# Patient Record
Sex: FEMALE | Race: ASIAN | Employment: UNEMPLOYED | URBAN - METROPOLITAN AREA
[De-identification: names, ages, dates, MRNs, and addresses within clinical notes are randomized per-mention and may not be internally consistent; named-entity substitution may affect disease eponyms.]

---

## 2018-08-14 ENCOUNTER — APPOINTMENT (EMERGENCY)
Dept: RADIOLOGY | Facility: HOSPITAL | Age: 58
End: 2018-08-14

## 2018-08-14 ENCOUNTER — APPOINTMENT (EMERGENCY)
Dept: CT IMAGING | Facility: HOSPITAL | Age: 58
End: 2018-08-14

## 2018-08-14 ENCOUNTER — HOSPITAL ENCOUNTER (EMERGENCY)
Facility: HOSPITAL | Age: 58
Discharge: HOME | End: 2018-08-14
Attending: EMERGENCY MEDICINE

## 2018-08-14 VITALS
TEMPERATURE: 98.5 F | WEIGHT: 154.32 LBS | OXYGEN SATURATION: 96 % | BODY MASS INDEX: 27.34 KG/M2 | HEIGHT: 63 IN | HEART RATE: 91 BPM | DIASTOLIC BLOOD PRESSURE: 72 MMHG | SYSTOLIC BLOOD PRESSURE: 145 MMHG | RESPIRATION RATE: 16 BRPM

## 2018-08-14 DIAGNOSIS — R07.9 CHEST PAIN: Primary | ICD-10-CM

## 2018-08-14 DIAGNOSIS — R10.9 ABDOMINAL PAIN: ICD-10-CM

## 2018-08-14 DIAGNOSIS — R93.89 ABNORMAL CT OF THE CHEST: ICD-10-CM

## 2018-08-14 LAB
ALBUMIN SERPL BCP-MCNC: 4 G/DL (ref 3.5–5)
ALP SERPL-CCNC: 99 U/L (ref 46–116)
ALT SERPL W P-5'-P-CCNC: 20 U/L (ref 12–78)
ANION GAP SERPL CALCULATED.3IONS-SCNC: 7 MMOL/L (ref 4–13)
AST SERPL W P-5'-P-CCNC: 17 U/L (ref 5–45)
BASOPHILS # BLD AUTO: 0.1 THOUSANDS/ΜL (ref 0–0.1)
BASOPHILS NFR BLD AUTO: 1 % (ref 0–1)
BILIRUB SERPL-MCNC: 0.6 MG/DL (ref 0.2–1)
BUN SERPL-MCNC: 16 MG/DL (ref 5–25)
CALCIUM SERPL-MCNC: 9.3 MG/DL (ref 8.3–10.1)
CHLORIDE SERPL-SCNC: 102 MMOL/L (ref 100–108)
CO2 SERPL-SCNC: 30 MMOL/L (ref 21–32)
CREAT SERPL-MCNC: 0.75 MG/DL (ref 0.6–1.3)
EOSINOPHIL # BLD AUTO: 0.03 THOUSAND/ΜL (ref 0–0.61)
EOSINOPHIL NFR BLD AUTO: 0 % (ref 0–6)
ERYTHROCYTE [DISTWIDTH] IN BLOOD BY AUTOMATED COUNT: 13.5 % (ref 11.6–15.1)
GFR SERPL CREATININE-BSD FRML MDRD: 88 ML/MIN/1.73SQ M
GLUCOSE SERPL-MCNC: 161 MG/DL (ref 65–140)
HCT VFR BLD AUTO: 41.3 % (ref 34.8–46.1)
HGB BLD-MCNC: 13.5 G/DL (ref 11.5–15.4)
IMM GRANULOCYTES # BLD AUTO: 0.01 THOUSAND/UL (ref 0–0.2)
IMM GRANULOCYTES NFR BLD AUTO: 0 % (ref 0–2)
LIPASE SERPL-CCNC: 176 U/L (ref 73–393)
LYMPHOCYTES # BLD AUTO: 2.77 THOUSANDS/ΜL (ref 0.6–4.47)
LYMPHOCYTES NFR BLD AUTO: 35 % (ref 14–44)
MCH RBC QN AUTO: 30.4 PG (ref 26.8–34.3)
MCHC RBC AUTO-ENTMCNC: 32.7 G/DL (ref 31.4–37.4)
MCV RBC AUTO: 93 FL (ref 82–98)
MONOCYTES # BLD AUTO: 0.68 THOUSAND/ΜL (ref 0.17–1.22)
MONOCYTES NFR BLD AUTO: 9 % (ref 4–12)
NEUTROPHILS # BLD AUTO: 4.35 THOUSANDS/ΜL (ref 1.85–7.62)
NEUTS SEG NFR BLD AUTO: 55 % (ref 43–75)
NRBC BLD AUTO-RTO: 0 /100 WBCS
PLATELET # BLD AUTO: 305 THOUSANDS/UL (ref 149–390)
PMV BLD AUTO: 9.8 FL (ref 8.9–12.7)
POTASSIUM SERPL-SCNC: 3.9 MMOL/L (ref 3.5–5.3)
PROT SERPL-MCNC: 7.6 G/DL (ref 6.4–8.2)
RBC # BLD AUTO: 4.44 MILLION/UL (ref 3.81–5.12)
SODIUM SERPL-SCNC: 139 MMOL/L (ref 136–145)
TROPONIN I SERPL-MCNC: 0.02 NG/ML
TROPONIN I SERPL-MCNC: <0.02 NG/ML
WBC # BLD AUTO: 7.94 THOUSAND/UL (ref 4.31–10.16)

## 2018-08-14 PROCEDURE — 36415 COLL VENOUS BLD VENIPUNCTURE: CPT

## 2018-08-14 PROCEDURE — 83690 ASSAY OF LIPASE: CPT | Performed by: EMERGENCY MEDICINE

## 2018-08-14 PROCEDURE — 71046 X-RAY EXAM CHEST 2 VIEWS: CPT

## 2018-08-14 PROCEDURE — 80053 COMPREHEN METABOLIC PANEL: CPT | Performed by: EMERGENCY MEDICINE

## 2018-08-14 PROCEDURE — 99285 EMERGENCY DEPT VISIT HI MDM: CPT

## 2018-08-14 PROCEDURE — 93005 ELECTROCARDIOGRAM TRACING: CPT

## 2018-08-14 PROCEDURE — 74177 CT ABD & PELVIS W/CONTRAST: CPT

## 2018-08-14 PROCEDURE — 71275 CT ANGIOGRAPHY CHEST: CPT

## 2018-08-14 PROCEDURE — 85025 COMPLETE CBC W/AUTO DIFF WBC: CPT | Performed by: EMERGENCY MEDICINE

## 2018-08-14 PROCEDURE — 84484 ASSAY OF TROPONIN QUANT: CPT | Performed by: EMERGENCY MEDICINE

## 2018-08-14 RX ADMIN — IOHEXOL 100 ML: 350 INJECTION, SOLUTION INTRAVENOUS at 04:04

## 2018-08-14 NOTE — DISCHARGE INSTRUCTIONS
??   ??????   ?? ??????????????????????????????????????????????????????????????????????????????????????????????????????????????????????????????????????????????????????????????????????????  ????????  · ?????    · ?????    · ????    · ????????????????????    · ???????    · ??????????  ?????????? 911?  · ??????????????     ¨ ??????????????????? 5 ???????    ¨ ???????????????????    ¨ ????    ¨ ?????    ¨ ????????????????????????    ???????????????  · ?????????????????    · ??????    · ?????????    · ???????????????  ???????????????????  · ????????????????    ????? ??????????????????????????????  · ?? ??????????????????????????????????    · ?????????????????? ?????? NSAID????????????????????????????  ? 72 ??????????????????? ????????????????????????????????????????????????????????????????????  ??????? ???????????????????????????????????  · ????? ??????????????????????????????????????????????????????????????????????????????????????????    · ??????????? ?????????????????????????????????????????????????    · ?????????? ??????????????????????????????????????????????????    · ???????? ????????????????????????????????????    · ???????????? ???????????????????????????????????? 65 ??????????????????? 5 ????????  © 2017 [address] Information is for End User's use only and may not be sold, redistributed or otherwise used for commercial purposes  All illustrations and images included in CareNotes® are the copyrighted property of [email or web address] , [employer]  or Raul Mcdonald  The above information is an  only  It is not intended as medical advice for individual conditions or treatments  Talk to your doctor, nurse or pharmacist before following any medical regimen to see if it is safe and effective for you        ????   ??????   ???? ???????????????  ???????????????  · ??????????    · ??????????    · ?????    · ?????????????    · ?????    · ????????    · ?????????  ???????????????????  · ???    · ???????    · ????????????    · ????????????????  ??????? ??????????????????????  · ?? ?????????????????????    · ?? ??????????????????????????  ?????  · ?? ??????????? 2 ???? 20 ? 30 ?????????????????????    · ????? ????????????????????????????????????????????????????????????????????????    · ????????? ?????????????????????????????????????????    · ????? ???????????????????????????????????????????????????????????????????????????????????????  ???????? ??????????????????????  · ??????????????????????????????????????    · ??????????????????????????????????????????????????????????    · ????????????????????????????????????????    · ??????????????????????????    · ???????????????????????????    · ???????????????????????????????????????????????  ??????????????? ???????????????????  © 2017 [address]  Information is for End User's use only and may not be sold, redistributed or otherwise used for commercial purposes  All illustrations and images included in CareNotes® are the copyrighted property of [employer]  or Raul Mcdonald  The above information is an  only  It is not intended as medical advice for individual conditions or treatments  Talk to your doctor, nurse or pharmacist before following any medical regimen to see if it is safe and effective for you

## 2018-08-14 NOTE — ED NOTES
Patient requests to speak with physician regarding further evaluation by physician in [place]   does not want CT or any further lab work at this time   Dr Seymour Morin notified     Emerita García, TOMMY  08/14/18 5765

## 2018-08-14 NOTE — ED PROVIDER NOTES
History  Chief Complaint   Patient presents with    Chest Pain     Pt c/o CP with SOB started after a "fleshing feeling from feet to head " Pt had this after eating two pieces of watermelon per family  Patient speaks [place], offered interpretor phone, which patient declined preferring to use daughter  HPI  62 y o  female presents with 3 hours of substernal chest pain and upper abdominal pain  Patient describes moderate pressure that started approximately 20 minutes after eating watermelon and is improving but still present  Patient states nothing worsens the pain and deep breathes improves the pain  Patient denies exertional component to her chest pain  Patient associates tremors that resolved; nausea/vomiting, fever/chills, diaphoresis, dyspnea, cough, hemoptysis, weakness, dizziness, back pain, syncope, focal weakness or numbness, leg pain or swelling  All other review of systems reviewed and noted to be negative  The patient denies a history of atherosclerotic disease (CAD/TIA/CVA/PAD)  Patient denies any history of hypertension, affirms hyperlipidemia, affirms diabetes; affirms early family history of CAD (male less than 49yo or female less than 71 yo) -- mother  The patient denies any use of tobacco in the past 90 days  The patient denies any use of illicit drugs, including cocaine  Patient affirms any immobilization of at least 3 days or surgery in the past 4 weeks - flight to [facility] on July 25th  Patient denies any history of DVT or PE  Patient denies any malignancy with treatment within the past 6 months  History of prior caesarean section, no other abdominal surgeries  Objective  PHYSICAL EXAM:  Constitutional:  No acute distress  Eyes: No scleral icterus or erythema  HENT:  Head normocephalic and atraumatic  Pharynx moist without erythema or exudate  CV:  Normal inspection with no rash, signs of infection, or trauma  Regular rate and rhythm  No murmur   Peripheral pulses intact and equal   Respiratory:  Lungs clear to auscultation bilaterally without adventitious sounds  Abdomen:  Soft, LUQ tenderness to palpitation, non-distended  Back:  No rash or signs of herpes zoster  Skin:  Normal color  Warm and Dry  Extremities:  Non-tender lower extremities without asymmetry; no clinical signs of DVT  No lower extremity edema  Neuro:  Alert  No gross motor deficits  Psych: Normal mood and affect  Medical Decision Making    HEART SCORE  History: 0   -Highly suspicious +2, Moderately suspicious +1   -Likelihood ratios: radiation to both arms (2 6), similar to prior ischemia (2 2), change in pattern over 24 hours (2 0)  EK (ST changes +2, Other abnormalities +1)  Age: 1 (greater 65 +2, 45-65 +1)  Risk factors: 2 (3 or more +2, 1-2 factors +1)  Troponin: 0 (greater than 3 times limit +2)  Total: 3    Patient presenting with chest pain and upper abdominal pain  The patient's HEART score is 3  EKG obtained and reviewed independently by myself, which was NSR without any acute ST/T wave changes  CXR will be obtained to evaluate for alternative pathologies, including pneumothorax or pneumonia  CBC will be obtained to evaluate for leukocytosis suggestive of infectious process such as pneumonia or myocarditis  CMP to evaluate hepatic and renal pathologies  Lipase to evaluate for potential pancreatitis  Patient recent flight from [place] requiring immobilization, considering this imaging will be obtained with a CT of the chest with PE protocol to evaluate for potential pulmonary embolism  Will include imaging of patient's abdomen and pelvis as difficult to differentiate patient's pain between the chest and upper abdomen  CXR obtained and reviewed independently by myself; this did not demonstrate any acute abnormalities  Labs obtained and reviewed and were essentially unremarkable  Initial troponin was negative    Repeated delta troponin and EKG at three hour lauri after initial troponin was negative, indicating a low likelihood of ACS  Laboratory and radiographic findings were discussed with the patient  There is a broad differential and I considered emergent diagnoses including pericarditis, ACS, angina, PE, pneumonia, rib fracture, and PTX, but these appear less likely considering the data gathered thus far  I have instructed the patient to return to the ER at any time if there are any new or worsening symptoms  The patient expressed understanding of and agreement with this plan  Opportunity was given for questions prior to discharge and all stated questions were answered to the patient's satisfaction  Home care instructions provided  Chest Pain       None       Past Medical History:   Diagnosis Date    Diabetes mellitus ([facility] [address] )        Past Surgical History:   Procedure Laterality Date     SECTION         History reviewed  No pertinent family history  I have reviewed and agree with the history as documented  Social History   Substance Use Topics    Smoking status: Never Smoker    Smokeless tobacco: Never Used    Alcohol use No        Review of Systems   Cardiovascular: Positive for chest pain  All other systems reviewed and are negative        Physical Exam  Physical Exam    Vital Signs  ED Triage Vitals [18 0004]   Temperature Pulse Respirations Blood Pressure SpO2   98 5 °F (36 9 °C) 98 18 (!) 185/99 97 %      Temp Source Heart Rate Source Patient Position - Orthostatic VS BP Location FiO2 (%)   Oral Monitor Lying Right arm --      Pain Score       4           Vitals:    18 0430 18 0530 18 0600 18 0615   BP: 129/65 120/56 145/72    Pulse: 96 93 82 91   Patient Position - Orthostatic VS:           Visual Acuity      ED Medications  Medications   iohexol (OMNIPAQUE) 350 MG/ML injection (MULTI-DOSE) 100 mL (100 mL Intravenous Given 18 0404)       Diagnostic Studies  Results Reviewed     Procedure Component Value Units Date/Time    Troponin I [[id number]]  (Normal) Collected:  08/14/18 0341    Lab Status:  Final result Specimen:  Blood from Arm, Left Updated:  08/14/18 0405     Troponin I 0 02 ng/mL     Lipase [[id number]]  (Normal) Collected:  08/14/18 0038    Lab Status:  Final result Specimen:  Blood from Arm, Left Updated:  08/14/18 0303     Lipase 176 u/L     Troponin I [[id number]]  (Normal) Collected:  08/14/18 0039    Lab Status:  Final result Specimen:  Blood from Arm, Left Updated:  08/14/18 0101     Troponin I <0 02 ng/mL     Comprehensive metabolic panel [[id number]]  (Abnormal) Collected:  08/14/18 0038    Lab Status:  Final result Specimen:  Blood from Arm, Left Updated:  08/14/18 0100     Sodium 139 mmol/L      Potassium 3 9 mmol/L      Chloride 102 mmol/L      CO2 30 mmol/L      Anion Gap 7 mmol/L      BUN 16 mg/dL      Creatinine 0 75 mg/dL      Glucose 161 (H) mg/dL      Calcium 9 3 mg/dL      AST 17 U/L      ALT 20 U/L      Alkaline Phosphatase 99 U/L      Total Protein 7 6 g/dL      Albumin 4 0 g/dL      Total Bilirubin 0 60 mg/dL      eGFR 88 ml/min/1 73sq m     Narrative:         National Kidney Disease Education Program recommendations are as follows:  GFR calculation is accurate only with a steady state creatinine  Chronic Kidney disease less than 60 ml/min/1 73 sq  meters  Kidney failure less than 15 ml/min/1 73 sq  meters      CBC and differential [[id number]] Collected:  08/14/18 0038    Lab Status:  Final result Specimen:  Blood from Arm, Left Updated:  08/14/18 0044     WBC 7 94 Thousand/uL      RBC 4 44 Million/uL      Hemoglobin 13 5 g/dL      Hematocrit 41 3 %      MCV 93 fL      MCH 30 4 pg      MCHC 32 7 g/dL      RDW 13 5 %      MPV 9 8 fL      Platelets 758 Thousands/uL      nRBC 0 /100 WBCs      Neutrophils Relative 55 %      Immat GRANS % 0 %      Lymphocytes Relative 35 %      Monocytes Relative 9 %      Eosinophils Relative 0 %      Basophils Relative 1 %      Neutrophils Absolute 4 35 Thousands/µL      Immature Grans Absolute 0 01 Thousand/uL      Lymphocytes Absolute 2 77 Thousands/µL      Monocytes Absolute 0 68 Thousand/µL      Eosinophils Absolute 0 03 Thousand/µL      Basophils Absolute 0 10 Thousands/µL                  X-ray chest 2 views   Final Result by Rosalina Snyder MD (08/14 3904)      No acute cardiopulmonary disease  Workstation performed: [id number]         PE Study with CT abdomen & pelvis with contrast   Final Result by Harris Wise MD (08/14 2889)      No acute pathology  No pulmonary embolism  Several pulmonary nodules, largest measuring 8 mm in the left lower lobe  Based on current Fleischner Society 2017 Guidelines on incidental pulmonary nodule, followup non-contrast CT is recommended at 3-6 months from the initial examination and, if    stable at that time, an additional followup is recommended for 18-24 months from the initial examination  Findings are consistent with the preliminary report from [employer] which was provided shortly after completion of the exam                Workstation performed: [id number]                    Procedures  Procedures       Phone Contacts  ED Phone Contact    ED Course  ED Course as of Aug 18 0446   Tue Aug 14, 2018   1596 Extensive discussion with the patient about the evaluation  Patient's family currently refusing CT imaging  Extensive discussion with them about the risks and benefits of this  Again offered phone  which patient continues to decline  Discussed multiple times with the patient's daughter about the risks of not treating potential etiologies the patient has chest and abdominal pain  Patient's family wishes to follow up in [place] tomorrow  3402 Patient's CT findings with concerns for constipation and multiple pulmonary nodules  Discussed patient's family and discussed follow-up with existing primary care physician    Discussed need for repeat CT imaging in 3-6 months for re-evaluation of the pulmonary nodules  Discussed follow-up and return precautions extensively with the patient  Discussed need for potential stress testing, patient's family declined ordering stress testing here  [facility]  CritCare Time    Disposition  Final diagnoses:   Chest pain   Abdominal pain   Abnormal CT of the chest     Time reflects when diagnosis was documented in both MDM as applicable and the Disposition within this note     Time User Action Codes Description Comment    8/18/2018  4:41 AM Dipesh Kevan Add [R07 9] Chest pain     8/18/2018  4:42 AM Dipesh Kevna Add [R10 9] Abdominal pain     8/18/2018  4:46 AM Dipesh Kevan Add [R93 8] Abnormal CT of the chest       ED Disposition     ED Disposition Condition Comment    Discharge  [place] discharge to home/self care  Condition at discharge: Stable        Follow-up Information     Follow up With Specialties Details Why Contact Info    Primary Care Physician  Schedule an appointment as soon as possible for a visit in 3 days Follow-up and reassessment  Discussed repeat CT imaging in 3-6 months as recommended  There are no discharge medications for this patient  No discharge procedures on file      ED Provider  Electronically Signed by           Sydnee Carrillo MD  08/18/18 5770

## 2018-08-16 LAB
ATRIAL RATE: 95 BPM
P AXIS: 41 DEGREES
PR INTERVAL: 126 MS
QRS AXIS: -11 DEGREES
QRSD INTERVAL: 88 MS
QT INTERVAL: 344 MS
QTC INTERVAL: 432 MS
T WAVE AXIS: 34 DEGREES
VENTRICULAR RATE: 95 BPM

## 2018-08-16 PROCEDURE — 93010 ELECTROCARDIOGRAM REPORT: CPT | Performed by: INTERNAL MEDICINE
